# Patient Record
Sex: MALE | Race: BLACK OR AFRICAN AMERICAN | NOT HISPANIC OR LATINO | Employment: UNEMPLOYED | URBAN - METROPOLITAN AREA
[De-identification: names, ages, dates, MRNs, and addresses within clinical notes are randomized per-mention and may not be internally consistent; named-entity substitution may affect disease eponyms.]

---

## 2023-12-04 PROBLEM — R91.8 RIGHT LOWER LOBE LUNG MASS: Status: ACTIVE | Noted: 2023-12-04

## 2023-12-08 PROBLEM — C34.91 NON-SMALL CELL CANCER OF RIGHT LUNG: Status: ACTIVE | Noted: 2023-12-08

## 2023-12-22 PROBLEM — Z71.6 TOBACCO ABUSE COUNSELING: Status: ACTIVE | Noted: 2023-12-22

## 2024-01-12 PROBLEM — I10 PRIMARY HYPERTENSION: Status: ACTIVE | Noted: 2024-01-12

## 2024-01-12 PROBLEM — E26.09 PRIMARY HYPERALDOSTERONISM: Status: RESOLVED | Noted: 2024-01-12 | Resolved: 2024-01-12

## 2024-01-12 PROBLEM — J96.01 ACUTE HYPOXIC RESPIRATORY FAILURE: Status: ACTIVE | Noted: 2024-01-12

## 2024-01-12 PROBLEM — E26.09 PRIMARY HYPERALDOSTERONISM: Status: ACTIVE | Noted: 2024-01-12

## 2024-01-12 PROBLEM — E61.1 IRON DEFICIENCY: Status: ACTIVE | Noted: 2024-01-12

## 2024-01-12 PROBLEM — J18.9 COMMUNITY ACQUIRED PNEUMONIA: Status: ACTIVE | Noted: 2024-01-12

## 2024-01-12 PROBLEM — K21.9 GASTROESOPHAGEAL REFLUX DISEASE WITHOUT ESOPHAGITIS: Status: ACTIVE | Noted: 2024-01-12

## 2024-01-12 PROBLEM — R91.8 RIGHT LOWER LOBE LUNG MASS: Status: RESOLVED | Noted: 2023-12-04 | Resolved: 2024-01-12

## 2024-01-16 PROBLEM — I95.9 HYPOTENSION: Status: ACTIVE | Noted: 2024-01-16

## 2024-01-18 PROBLEM — I95.9 HYPOTENSION: Status: RESOLVED | Noted: 2024-01-16 | Resolved: 2024-01-18

## 2024-01-18 PROBLEM — J96.01 ACUTE HYPOXIC RESPIRATORY FAILURE: Status: RESOLVED | Noted: 2024-01-12 | Resolved: 2024-01-18

## 2024-01-18 PROBLEM — D72.825 BANDEMIA: Status: ACTIVE | Noted: 2024-01-18

## 2024-01-26 PROBLEM — Z51.11 ENCOUNTER FOR ANTINEOPLASTIC CHEMOTHERAPY: Status: ACTIVE | Noted: 2024-01-26

## 2024-01-28 PROBLEM — T17.500A MUCUS PLUGGING OF BRONCHI: Status: ACTIVE | Noted: 2024-01-28

## 2024-01-28 PROBLEM — C34.01 MALIGNANT NEOPLASM OF HILUS OF RIGHT LUNG: Status: ACTIVE | Noted: 2024-01-28

## 2024-03-08 PROBLEM — J98.09 BRONCHIAL STENOSIS, RIGHT: Status: ACTIVE | Noted: 2024-03-08

## 2024-03-27 ENCOUNTER — PATIENT OUTREACH (OUTPATIENT)
Dept: ADMINISTRATIVE | Facility: HOSPITAL | Age: 64
End: 2024-03-27

## 2024-03-27 DIAGNOSIS — Z12.11 SCREENING FOR COLORECTAL CANCER: Primary | ICD-10-CM

## 2024-03-27 DIAGNOSIS — Z12.12 SCREENING FOR COLORECTAL CANCER: Primary | ICD-10-CM

## 2024-04-22 PROBLEM — J18.9 COMMUNITY ACQUIRED PNEUMONIA OF RIGHT LOWER LOBE OF LUNG: Status: RESOLVED | Noted: 2024-01-12 | Resolved: 2024-04-22

## 2025-02-14 PROBLEM — G93.89 BRAIN MASS: Status: ACTIVE | Noted: 2025-02-14

## 2025-02-15 PROBLEM — G93.6 VASOGENIC BRAIN EDEMA: Status: ACTIVE | Noted: 2025-02-15

## 2025-02-22 PROBLEM — I26.94 MULTIPLE SUBSEGMENTAL PULMONARY EMBOLI WITHOUT ACUTE COR PULMONALE: Status: ACTIVE | Noted: 2025-02-22

## 2025-02-26 PROBLEM — R55 SYNCOPE: Status: ACTIVE | Noted: 2025-02-26

## 2025-03-27 ENCOUNTER — OUTPATIENT CASE MANAGEMENT (OUTPATIENT)
Dept: ADMINISTRATIVE | Facility: OTHER | Age: 65
End: 2025-03-27

## 2025-03-27 NOTE — PROGRESS NOTES
Received office visit from pt's significant other requesting assistance with a gas card on behalf of pt; Sw provided 1/$20 gas card ([phone]) from [employer]; no other needs were identified at that time; Sw will continue to follow pt to assist with needs and concerns.    TIFFANIE Darnell    Ext. [phone]  Pager #[phone]